# Patient Record
Sex: FEMALE | Race: WHITE | NOT HISPANIC OR LATINO | Employment: FULL TIME | URBAN - METROPOLITAN AREA
[De-identification: names, ages, dates, MRNs, and addresses within clinical notes are randomized per-mention and may not be internally consistent; named-entity substitution may affect disease eponyms.]

---

## 2018-10-06 ENCOUNTER — HOSPITAL ENCOUNTER (EMERGENCY)
Facility: MEDICAL CENTER | Age: 48
End: 2018-10-06
Attending: EMERGENCY MEDICINE
Payer: MEDICAID

## 2018-10-06 ENCOUNTER — APPOINTMENT (OUTPATIENT)
Dept: RADIOLOGY | Facility: MEDICAL CENTER | Age: 48
End: 2018-10-06
Attending: EMERGENCY MEDICINE
Payer: MEDICAID

## 2018-10-06 VITALS
TEMPERATURE: 98.1 F | SYSTOLIC BLOOD PRESSURE: 128 MMHG | HEART RATE: 81 BPM | WEIGHT: 199.3 LBS | BODY MASS INDEX: 29.52 KG/M2 | OXYGEN SATURATION: 95 % | RESPIRATION RATE: 16 BRPM | DIASTOLIC BLOOD PRESSURE: 58 MMHG | HEIGHT: 69 IN

## 2018-10-06 DIAGNOSIS — R10.32 LLQ ABDOMINAL PAIN: ICD-10-CM

## 2018-10-06 LAB
ALBUMIN SERPL BCP-MCNC: 4.3 G/DL (ref 3.2–4.9)
ALBUMIN/GLOB SERPL: 1.4 G/DL
ALP SERPL-CCNC: 39 U/L (ref 30–99)
ALT SERPL-CCNC: 21 U/L (ref 2–50)
ANION GAP SERPL CALC-SCNC: 8 MMOL/L (ref 0–11.9)
APPEARANCE UR: CLEAR
AST SERPL-CCNC: 19 U/L (ref 12–45)
BASOPHILS # BLD AUTO: 0.4 % (ref 0–1.8)
BASOPHILS # BLD: 0.03 K/UL (ref 0–0.12)
BILIRUB SERPL-MCNC: 0.4 MG/DL (ref 0.1–1.5)
BILIRUB UR QL STRIP.AUTO: NEGATIVE
BUN SERPL-MCNC: 10 MG/DL (ref 8–22)
CALCIUM SERPL-MCNC: 9.8 MG/DL (ref 8.5–10.5)
CHLORIDE SERPL-SCNC: 104 MMOL/L (ref 96–112)
CO2 SERPL-SCNC: 25 MMOL/L (ref 20–33)
COLOR UR: YELLOW
CREAT SERPL-MCNC: 0.76 MG/DL (ref 0.5–1.4)
EOSINOPHIL # BLD AUTO: 0.21 K/UL (ref 0–0.51)
EOSINOPHIL NFR BLD: 2.9 % (ref 0–6.9)
ERYTHROCYTE [DISTWIDTH] IN BLOOD BY AUTOMATED COUNT: 39.7 FL (ref 35.9–50)
GLOBULIN SER CALC-MCNC: 3 G/DL (ref 1.9–3.5)
GLUCOSE SERPL-MCNC: 84 MG/DL (ref 65–99)
GLUCOSE UR STRIP.AUTO-MCNC: NEGATIVE MG/DL
HCG SERPL QL: NEGATIVE
HCT VFR BLD AUTO: 41.6 % (ref 37–47)
HGB BLD-MCNC: 13.7 G/DL (ref 12–16)
IMM GRANULOCYTES # BLD AUTO: 0.02 K/UL (ref 0–0.11)
IMM GRANULOCYTES NFR BLD AUTO: 0.3 % (ref 0–0.9)
KETONES UR STRIP.AUTO-MCNC: NEGATIVE MG/DL
LEUKOCYTE ESTERASE UR QL STRIP.AUTO: NEGATIVE
LYMPHOCYTES # BLD AUTO: 2.08 K/UL (ref 1–4.8)
LYMPHOCYTES NFR BLD: 29.1 % (ref 22–41)
MCH RBC QN AUTO: 28.1 PG (ref 27–33)
MCHC RBC AUTO-ENTMCNC: 32.9 G/DL (ref 33.6–35)
MCV RBC AUTO: 85.4 FL (ref 81.4–97.8)
MICRO URNS: NORMAL
MONOCYTES # BLD AUTO: 0.45 K/UL (ref 0–0.85)
MONOCYTES NFR BLD AUTO: 6.3 % (ref 0–13.4)
NEUTROPHILS # BLD AUTO: 4.37 K/UL (ref 2–7.15)
NEUTROPHILS NFR BLD: 61 % (ref 44–72)
NITRITE UR QL STRIP.AUTO: NEGATIVE
NRBC # BLD AUTO: 0 K/UL
NRBC BLD-RTO: 0 /100 WBC
PH UR STRIP.AUTO: 7 [PH]
PLATELET # BLD AUTO: 258 K/UL (ref 164–446)
PMV BLD AUTO: 9.1 FL (ref 9–12.9)
POTASSIUM SERPL-SCNC: 3.8 MMOL/L (ref 3.6–5.5)
PROT SERPL-MCNC: 7.3 G/DL (ref 6–8.2)
PROT UR QL STRIP: NEGATIVE MG/DL
RBC # BLD AUTO: 4.87 M/UL (ref 4.2–5.4)
RBC UR QL AUTO: NEGATIVE
SODIUM SERPL-SCNC: 137 MMOL/L (ref 135–145)
SP GR UR STRIP.AUTO: 1.02
UROBILINOGEN UR STRIP.AUTO-MCNC: 1 MG/DL
WBC # BLD AUTO: 7.2 K/UL (ref 4.8–10.8)

## 2018-10-06 PROCEDURE — 80053 COMPREHEN METABOLIC PANEL: CPT

## 2018-10-06 PROCEDURE — 84703 CHORIONIC GONADOTROPIN ASSAY: CPT

## 2018-10-06 PROCEDURE — 36415 COLL VENOUS BLD VENIPUNCTURE: CPT

## 2018-10-06 PROCEDURE — 74177 CT ABD & PELVIS W/CONTRAST: CPT

## 2018-10-06 PROCEDURE — 99284 EMERGENCY DEPT VISIT MOD MDM: CPT

## 2018-10-06 PROCEDURE — 81003 URINALYSIS AUTO W/O SCOPE: CPT

## 2018-10-06 PROCEDURE — 700105 HCHG RX REV CODE 258: Performed by: EMERGENCY MEDICINE

## 2018-10-06 PROCEDURE — 85025 COMPLETE CBC W/AUTO DIFF WBC: CPT

## 2018-10-06 PROCEDURE — 700117 HCHG RX CONTRAST REV CODE 255: Performed by: EMERGENCY MEDICINE

## 2018-10-06 RX ORDER — HYDROCORTISONE ACETATE 25 MG/1
25 SUPPOSITORY RECTAL EVERY 12 HOURS
Qty: 10 SUPPOSITORY | Refills: 0 | Status: SHIPPED | OUTPATIENT
Start: 2018-10-06 | End: 2019-05-24

## 2018-10-06 RX ORDER — SODIUM CHLORIDE 9 MG/ML
1000 INJECTION, SOLUTION INTRAVENOUS ONCE
Status: COMPLETED | OUTPATIENT
Start: 2018-10-06 | End: 2018-10-06

## 2018-10-06 RX ADMIN — IOHEXOL 100 ML: 350 INJECTION, SOLUTION INTRAVENOUS at 18:19

## 2018-10-06 RX ADMIN — SODIUM CHLORIDE 1000 ML: 9 INJECTION, SOLUTION INTRAVENOUS at 16:36

## 2018-10-06 ASSESSMENT — PAIN SCALES - GENERAL
PAINLEVEL_OUTOF10: 1
PAINLEVEL_OUTOF10: 6

## 2018-10-06 NOTE — ED PROVIDER NOTES
"ED Provider Note    Scribed for Yady Osorio M.D. by Tre Hickey. 10/6/2018, 3:59 PM.    Primary care provider: No primary care provider on file.  Means of arrival: Walk-In  History obtained from: Patient  History limited by: None    CHIEF COMPLAINT  Chief Complaint   Patient presents with   • Rectal Pain       HPI  Christopher Kinsey is a 48 y.o. female who presents to the Emergency Department complaining of rectal pain onset one month ago. Patient describes that the pain originates from a bump near her rectum that appeared one month ago. She describes her bowel movements as normal. She has associated left lower abdominal pain onset three days ago that is severe in quality and radiates down near her rectum. Additional symptoms include fever, light-headedness, nausea, and loss of appetite. The pain is exacerbated with exertion. Patient denies any painful urination.      REVIEW OF SYSTEMS  Pertinent positives include rectal pain, left lower abdominal pain, fever, light-headedness, nausea, and loss of appetite. Pertinent negatives include no painful urination. All other systems negative.     PAST MEDICAL HISTORY  None noted.     SURGICAL HISTORY  Hx of Partial USO    SOCIAL HISTORY  Social History   Substance Use Topics   • Smoking status: Not on file   • Smokeless tobacco: Not on file   • Alcohol use Not on file      History   Drug use: Unknown       FAMILY HISTORY  None noted.     CURRENT MEDICATIONS  Home Medications    **Home medications have not yet been reviewed for this encounter**         ALLERGIES  No Known Allergies    PHYSICAL EXAM  VITAL SIGNS: /58   Pulse 83   Temp 36.7 °C (98.1 °F) (Temporal)   Resp 16   Ht 1.753 m (5' 9\")   Wt 90.4 kg (199 lb 4.7 oz)   SpO2 96%   BMI 29.43 kg/m²   Constitutional: Alert in no apparent distress.  HENT: No signs of trauma, Bilateral external ears normal, Nose normal.   Eyes: Pupils are equal and reactive, Conjunctiva normal, Non-icteric.   Neck: Normal range " of motion, No tenderness, Supple, No stridor.   Cardiovascular: Regular rate and rhythm, no murmurs.   Thorax & Lungs: Normal breath sounds, No respiratory distress, No wheezing, No chest tenderness.   Abdomen: Bowel sounds normal, Soft, mild left lower quadrant tenderness, No masses, No peritoneal signs.  Skin: Warm, Dry, No erythema, No rash.   Back:  No CVA tenderness.  Musculoskeletal:  No major deformities noted.   Neurologic: Alert, moving all extremities without difficulty, no focal deficits.  Rectal: 1 cm spherical mass seen approximately 8 o'clock with no surrounding cellulitis, mobile, and slightly firm.     LABS  Labs Reviewed   CBC WITH DIFFERENTIAL - Abnormal; Notable for the following:        Result Value    MCHC 32.9 (*)     All other components within normal limits   COMP METABOLIC PANEL   URINALYSIS,CULTURE IF INDICATED   HCG QUAL SERUM   ESTIMATED GFR     All labs reviewed by me.    RADIOLOGY  CT-ABDOMEN-PELVIS WITH   Final Result         1. No acute inflammatory change identified in the abdomen or pelvis.      2. No discrete fluid collection visualized in the rectal or anal area.        The radiologist's interpretation of all radiological studies have been reviewed by me.    COURSE & MEDICAL DECISION MAKING  Pertinent Labs & Imaging studies reviewed. (See chart for details)    Differential diagnoses include but are not limited to: Diverticulitis vs. IBS vs. UTI    3:59 PM - Patient seen and examined at bedside. Patient will be treated with IV fluids secondary to her clinical dehydration and decreased PO intake. Ordered CT Abdomen, CBC, CMP, and UA to evaluate her symptoms.     6:48 PM - Patient was reevaluated at bedside. Discussed lab and radiology results and informed the patient that everything was negative. Advised the patient to see a general surgeon and she is agreeable with this treatment plan.     HYDRATION: Based on the patient's presentation of Dehydration the patient was given IV  fluids. IV Hydration was used becasue oral hydration failed due to patient is decreased PO intake. Upon recheck following hydration, the patient was hydrated.    Decision Making:  This is a 48 y.o. year old female who presents with left lower quadrant abdominal pain.  Differential is as indicated above.  Labs and CT were performed to rule out diverticulitis.  Urinalysis is negative.  Labs are otherwise unremarkable no elevated white count or left shift no abnormal LFTs.  CT scan did not show any evidence of diverticulitis or inflammation in the abdomen.  She does have the small firm mass on the rectum it is in all location for hemorrhoid and is not on the anal verge just slightly lateral to this however it is within the anal tissue and appears vascular in nature.  I do think she requires follow-up with a general surgeon to evaluate this and have this possibly removed it could be an abnormal hemorrhoid versus malignancy.  She is agreeable she will be given the information to follow-up with Dr. Edwards, general surgery and will be discharged.     The patient will return for new or worsening symptoms and is stable at the time of discharge. Patient was given return precautions. Patient and/or family member verbalizes understanding and will comply.    DISPOSITION:  Patient will be discharged home in stable condition.    FOLLOW UP:  Ino Edwards M.D.  [address] [place] [id number] [phone]          [facility], Emergency Dept  [address] [facility] [id number] [phone]          OUTPATIENT MEDICATIONS:  New Prescriptions    HYDROCORTISONE (ANUSOL-HC) 25 MG SUPPOS    Insert 1 Suppository in rectum every 12 hours.     FINAL IMPRESSION  1. LLQ abdominal pain        This dictation has been created using voice recognition software and/or scribes. The accuracy of the dictation is limited by the abilities of the software and the expertise of the scribes. I expect there may be  some errors of grammar and possibly content. I made every attempt to manually correct the errors within my dictation. However, errors related to voice recognition software and/or scribes may still exist and should be interpreted within the appropriate context.     I, Tre Hickey (Scribe), am scribing for, and in the presence of, Yady Osorio M.D..    Electronically signed by: Tre Hickey (Scribe), 10/6/2018    I, Yady Osorio M.D. personally performed the services described in this documentation, as scribed by Tre Hickey in my presence, and it is both accurate and complete. C.     The note accurately reflects work and decisions made by me.  Yady Osorio  10/6/2018  7:30 PM

## 2019-05-24 ENCOUNTER — HOSPITAL ENCOUNTER (EMERGENCY)
Facility: MEDICAL CENTER | Age: 49
End: 2019-05-24
Attending: EMERGENCY MEDICINE
Payer: COMMERCIAL

## 2019-05-24 VITALS
RESPIRATION RATE: 15 BRPM | OXYGEN SATURATION: 94 % | WEIGHT: 200 LBS | HEART RATE: 78 BPM | DIASTOLIC BLOOD PRESSURE: 77 MMHG | TEMPERATURE: 98.2 F | BODY MASS INDEX: 29.53 KG/M2 | SYSTOLIC BLOOD PRESSURE: 138 MMHG

## 2019-05-24 DIAGNOSIS — S30.0XXA CONTUSION OF LOWER BACK, INITIAL ENCOUNTER: ICD-10-CM

## 2019-05-24 PROCEDURE — A9270 NON-COVERED ITEM OR SERVICE: HCPCS | Performed by: EMERGENCY MEDICINE

## 2019-05-24 PROCEDURE — 99284 EMERGENCY DEPT VISIT MOD MDM: CPT

## 2019-05-24 PROCEDURE — 700102 HCHG RX REV CODE 250 W/ 637 OVERRIDE(OP): Performed by: EMERGENCY MEDICINE

## 2019-05-24 RX ORDER — CYCLOBENZAPRINE HCL 10 MG
10 TABLET ORAL 3 TIMES DAILY PRN
Qty: 20 TAB | Refills: 0 | Status: SHIPPED | OUTPATIENT
Start: 2019-05-24 | End: 2019-09-11

## 2019-05-24 RX ORDER — IBUPROFEN 600 MG/1
600 TABLET ORAL ONCE
Status: COMPLETED | OUTPATIENT
Start: 2019-05-24 | End: 2019-05-24

## 2019-05-24 RX ORDER — IBUPROFEN 600 MG/1
600 TABLET ORAL EVERY 6 HOURS PRN
Qty: 20 TAB | Refills: 0 | Status: SHIPPED | OUTPATIENT
Start: 2019-05-24 | End: 2019-09-11

## 2019-05-24 RX ADMIN — IBUPROFEN 600 MG: 600 TABLET ORAL at 16:15

## 2019-05-24 ASSESSMENT — LIFESTYLE VARIABLES: DO YOU DRINK ALCOHOL: NO

## 2019-05-24 NOTE — ED NOTES
Received orders for DC. Educated regarding prescribed medications for pain dn muscle spasm and establishing care with [facility]. All questions addressed. Able to dress self and is ambulatory with a steady gait. VSS.

## 2019-05-24 NOTE — ED TRIAGE NOTES
"Pt to triage via wheelchair , pt c/o pain to her rt low back and her rt hip. Pt states \" she was on the sofa with her dog, and \"accidentally fell off the sofa when the cushions under her shifted\" . Pt states \" she landed on her dog's chew toy. States difficulty walking , had her daughter bring her to the ER  "

## 2019-05-24 NOTE — ED PROVIDER NOTES
ED Provider Note    Scribed for Elliott Russell M.D. by Matthew Alegria. 5/24/2019, 3:43 PM.    Means of arrival: Walk in  History obtained from: Patient  History limited by: None    CHIEF COMPLAINT  Chief Complaint   Patient presents with   • T-5000 FALL       HPI  Christopher Kinsey is a 48 y.o. female who presents to the Emergency Department complaining of low back pain onset 45 minutes ago. Patient reports she slid off her couch and her low back landed on her dogs chew toy. She reports her pain is worse with movement. She reports having difficulty breathing initially after the incident which is now resolved.     REVIEW OF SYSTEMS  Pertinent positives include low back pain, difficulty breathing (now resolved).   See HPI for details    PAST MEDICAL HISTORY   None noted    SURGICAL HISTORY  patient denies any surgical history    SOCIAL HISTORY  Social History   Substance Use Topics   • Smoking status: Unknown If Ever Smoked      History   Drug use: Unknown     CURRENT MEDICATIONS  Home Medications     Reviewed by Ramirez Alvarado R.N. (Registered Nurse) on 05/24/19 at 1524  Med List Status: Complete   Medication Last Dose Status        Patient Steven Taking any Medications                       ALLERGIES  No Known Allergies    PHYSICAL EXAM  VITAL SIGNS: /78   Pulse 80   Temp 36.9 °C (98.4 °F) (Temporal)   Resp 16   Wt 90.7 kg (200 lb)   SpO2 94%   BMI 29.53 kg/m²     Nursing note and vitals reviewed.  Constitutional: No distress.   HENT: Head is atraumatic. Oropharynx is moist.   Eyes: Conjunctivae are normal. Pupils are equal, round, and reactive to light.   Cardiovascular: Normal peripheral perfusion  Respiratory: No respiratory distress.   Musculoskeletal: Normal range of motion.   Back: Mild tenderness about the mid low back, no external evidence of trauma, no abrasion no bruising.  Neurological: Alert. No focal deficits noted.    Skin: No abrasion no bruising to site of injury  Psych:  Appropriate for clinical situation       COURSE & MEDICAL DECISION MAKING  Nursing notes, VS, PMSFHx reviewed in chart.    3:43 PM - Patient seen and examined at bedside. Presents with low back pain. She has mild tenderness about the mid low back, no external evidence of trauma, no abrasion no bruising. Given mechanism and my exam, suspicion for bony injury is very low and I do not believe diagnostic imaging is indicated. Patient will be treated with motrin 600mg. Plan of care discussed with the patient and she agrees to this plan of care.      DISPOSITION:  Patient will be discharged home in stable condition.    FOLLOW UP:  [facility], Emergency Dept  [address] [facility] [id number] [phone]    If symptoms worsen    [address] [address]  [phone]  Schedule an appointment as soon as possible for a visit         OUTPATIENT MEDICATIONS:  New Prescriptions    CYCLOBENZAPRINE (FLEXERIL) 10 MG TAB    Take 1 Tab by mouth 3 times a day as needed.    IBUPROFEN (MOTRIN) 600 MG TAB    Take 1 Tab by mouth every 6 hours as needed.       The patient was discharged home with an information sheet on contusions and told to return immediately for any signs or symptoms listed.  The patient agreed to the discharge precautions and follow-up plan which is documented in EPIC.    FINAL IMPRESSION  1. Contusion of lower back, initial encounter          Matthew GALINDO (Scribe), am scribing for, and in the presence of, Elliott Russell M.D..    Electronically signed by: Matthew Alegria (Robynibe), 5/24/2019    Elliott GALINDO M.D. personally performed the services described in this documentation, as scribed by Matthew Alegria in my presence, and it is both accurate and complete. E.    The note accurately reflects work and decisions made by me.  Elliott Russell  5/24/2019  4:58 PM

## 2019-09-11 ENCOUNTER — HOSPITAL ENCOUNTER (EMERGENCY)
Facility: MEDICAL CENTER | Age: 49
End: 2019-09-11
Payer: COMMERCIAL

## 2019-09-11 VITALS
TEMPERATURE: 98.4 F | OXYGEN SATURATION: 94 % | WEIGHT: 196.43 LBS | HEIGHT: 69 IN | BODY MASS INDEX: 29.09 KG/M2 | HEART RATE: 77 BPM | RESPIRATION RATE: 16 BRPM | DIASTOLIC BLOOD PRESSURE: 70 MMHG | SYSTOLIC BLOOD PRESSURE: 120 MMHG

## 2019-09-11 PROCEDURE — 302449 STATCHG TRIAGE ONLY (STATISTIC)

## 2019-09-11 SDOH — HEALTH STABILITY: MENTAL HEALTH: HOW OFTEN DO YOU HAVE 6 OR MORE DRINKS ON ONE OCCASION?: NEVER

## 2019-09-11 SDOH — HEALTH STABILITY: MENTAL HEALTH: HOW OFTEN DO YOU HAVE A DRINK CONTAINING ALCOHOL?: 2-4 TIMES A MONTH

## 2019-09-11 SDOH — HEALTH STABILITY: MENTAL HEALTH: HOW MANY STANDARD DRINKS CONTAINING ALCOHOL DO YOU HAVE ON A TYPICAL DAY?: 1 OR 2

## 2019-09-11 NOTE — ED TRIAGE NOTES
Ambulatory to triage with   Chief Complaint   Patient presents with   • T-5000 Head Injury   • Facial Pain   • Dizziness   • Nausea   • Loss of Vision     L Eye   States she was head butted by dog last night with + LOC. Above complaints today. States loss of vision in L eye for a period of time this morning.

## 2024-03-16 ENCOUNTER — HOSPITAL ENCOUNTER (EMERGENCY)
Facility: MEDICAL CENTER | Age: 54
End: 2024-03-16
Attending: EMERGENCY MEDICINE
Payer: COMMERCIAL

## 2024-03-16 ENCOUNTER — OFFICE VISIT (OUTPATIENT)
Dept: URGENT CARE | Facility: PHYSICIAN GROUP | Age: 54
End: 2024-03-16
Payer: COMMERCIAL

## 2024-03-16 ENCOUNTER — APPOINTMENT (OUTPATIENT)
Dept: RADIOLOGY | Facility: MEDICAL CENTER | Age: 54
End: 2024-03-16
Attending: EMERGENCY MEDICINE
Payer: COMMERCIAL

## 2024-03-16 VITALS
HEART RATE: 73 BPM | SYSTOLIC BLOOD PRESSURE: 121 MMHG | OXYGEN SATURATION: 98 % | RESPIRATION RATE: 20 BRPM | TEMPERATURE: 97 F | WEIGHT: 210 LBS | DIASTOLIC BLOOD PRESSURE: 65 MMHG | BODY MASS INDEX: 31.01 KG/M2

## 2024-03-16 VITALS
DIASTOLIC BLOOD PRESSURE: 90 MMHG | OXYGEN SATURATION: 97 % | BODY MASS INDEX: 31.1 KG/M2 | HEART RATE: 74 BPM | WEIGHT: 210 LBS | RESPIRATION RATE: 16 BRPM | TEMPERATURE: 98.7 F | HEIGHT: 69 IN | SYSTOLIC BLOOD PRESSURE: 140 MMHG

## 2024-03-16 DIAGNOSIS — R06.02 SHORTNESS OF BREATH: ICD-10-CM

## 2024-03-16 DIAGNOSIS — R42 DIZZINESS: ICD-10-CM

## 2024-03-16 DIAGNOSIS — F41.9 ANXIETY: ICD-10-CM

## 2024-03-16 DIAGNOSIS — F43.9 STRESS: ICD-10-CM

## 2024-03-16 DIAGNOSIS — R53.83 OTHER FATIGUE: ICD-10-CM

## 2024-03-16 DIAGNOSIS — R07.9 CHEST PAIN, UNSPECIFIED TYPE: Primary | ICD-10-CM

## 2024-03-16 DIAGNOSIS — R07.89 FEELING OF CHEST TIGHTNESS: ICD-10-CM

## 2024-03-16 DIAGNOSIS — T88.7XXA MEDICATION SIDE EFFECT: ICD-10-CM

## 2024-03-16 LAB
ALBUMIN SERPL BCP-MCNC: 4.5 G/DL (ref 3.2–4.9)
ALBUMIN/GLOB SERPL: 1.4 G/DL
ALP SERPL-CCNC: 57 U/L (ref 30–99)
ALT SERPL-CCNC: 25 U/L (ref 2–50)
ANION GAP SERPL CALC-SCNC: 11 MMOL/L (ref 7–16)
AST SERPL-CCNC: 26 U/L (ref 12–45)
BASOPHILS # BLD AUTO: 0.2 % (ref 0–1.8)
BASOPHILS # BLD: 0.01 K/UL (ref 0–0.12)
BILIRUB SERPL-MCNC: 0.3 MG/DL (ref 0.1–1.5)
BUN SERPL-MCNC: 16 MG/DL (ref 8–22)
CALCIUM ALBUM COR SERPL-MCNC: 9.1 MG/DL (ref 8.5–10.5)
CALCIUM SERPL-MCNC: 9.5 MG/DL (ref 8.5–10.5)
CHLORIDE SERPL-SCNC: 106 MMOL/L (ref 96–112)
CO2 SERPL-SCNC: 23 MMOL/L (ref 20–33)
CREAT SERPL-MCNC: 0.84 MG/DL (ref 0.5–1.4)
EKG IMPRESSION: NORMAL
EOSINOPHIL # BLD AUTO: 0.21 K/UL (ref 0–0.51)
EOSINOPHIL NFR BLD: 3.7 % (ref 0–6.9)
ERYTHROCYTE [DISTWIDTH] IN BLOOD BY AUTOMATED COUNT: 40.1 FL (ref 35.9–50)
FLUAV RNA SPEC QL NAA+PROBE: NEGATIVE
FLUBV RNA SPEC QL NAA+PROBE: NEGATIVE
GFR SERPLBLD CREATININE-BSD FMLA CKD-EPI: 83 ML/MIN/1.73 M 2
GLOBULIN SER CALC-MCNC: 3.2 G/DL (ref 1.9–3.5)
GLUCOSE SERPL-MCNC: 111 MG/DL (ref 65–99)
HCT VFR BLD AUTO: 41.5 % (ref 37–47)
HGB BLD-MCNC: 14.1 G/DL (ref 12–16)
IMM GRANULOCYTES # BLD AUTO: 0.02 K/UL (ref 0–0.11)
IMM GRANULOCYTES NFR BLD AUTO: 0.4 % (ref 0–0.9)
LYMPHOCYTES # BLD AUTO: 1.99 K/UL (ref 1–4.8)
LYMPHOCYTES NFR BLD: 35 % (ref 22–41)
MCH RBC QN AUTO: 28.1 PG (ref 27–33)
MCHC RBC AUTO-ENTMCNC: 34 G/DL (ref 32.2–35.5)
MCV RBC AUTO: 82.8 FL (ref 81.4–97.8)
MONOCYTES # BLD AUTO: 0.44 K/UL (ref 0–0.85)
MONOCYTES NFR BLD AUTO: 7.7 % (ref 0–13.4)
NEUTROPHILS # BLD AUTO: 3.01 K/UL (ref 1.82–7.42)
NEUTROPHILS NFR BLD: 53 % (ref 44–72)
NRBC # BLD AUTO: 0 K/UL
NRBC BLD-RTO: 0 /100 WBC (ref 0–0.2)
NT-PROBNP SERPL IA-MCNC: <36 PG/ML (ref 0–125)
PLATELET # BLD AUTO: 259 K/UL (ref 164–446)
PMV BLD AUTO: 9.3 FL (ref 9–12.9)
POTASSIUM SERPL-SCNC: 4 MMOL/L (ref 3.6–5.5)
PROT SERPL-MCNC: 7.7 G/DL (ref 6–8.2)
RBC # BLD AUTO: 5.01 M/UL (ref 4.2–5.4)
RSV RNA SPEC QL NAA+PROBE: NEGATIVE
SARS-COV-2 RNA RESP QL NAA+PROBE: NOTDETECTED
SODIUM SERPL-SCNC: 140 MMOL/L (ref 135–145)
TROPONIN T SERPL-MCNC: 15 NG/L (ref 6–19)
WBC # BLD AUTO: 5.7 K/UL (ref 4.8–10.8)

## 2024-03-16 PROCEDURE — 0241U HCHG SARS-COV-2 COVID-19 NFCT DS RESP RNA 4 TRGT ED POC: CPT

## 2024-03-16 PROCEDURE — 93005 ELECTROCARDIOGRAM TRACING: CPT | Performed by: EMERGENCY MEDICINE

## 2024-03-16 PROCEDURE — A9270 NON-COVERED ITEM OR SERVICE: HCPCS | Performed by: EMERGENCY MEDICINE

## 2024-03-16 PROCEDURE — 71045 X-RAY EXAM CHEST 1 VIEW: CPT

## 2024-03-16 PROCEDURE — 36415 COLL VENOUS BLD VENIPUNCTURE: CPT

## 2024-03-16 PROCEDURE — 93005 ELECTROCARDIOGRAM TRACING: CPT

## 2024-03-16 PROCEDURE — 3080F DIAST BP >= 90 MM HG: CPT | Performed by: NURSE PRACTITIONER

## 2024-03-16 PROCEDURE — 99205 OFFICE O/P NEW HI 60 MIN: CPT | Performed by: NURSE PRACTITIONER

## 2024-03-16 PROCEDURE — 93000 ELECTROCARDIOGRAM COMPLETE: CPT | Performed by: NURSE PRACTITIONER

## 2024-03-16 PROCEDURE — 99284 EMERGENCY DEPT VISIT MOD MDM: CPT

## 2024-03-16 PROCEDURE — 83880 ASSAY OF NATRIURETIC PEPTIDE: CPT

## 2024-03-16 PROCEDURE — 85025 COMPLETE CBC W/AUTO DIFF WBC: CPT

## 2024-03-16 PROCEDURE — 80053 COMPREHEN METABOLIC PANEL: CPT

## 2024-03-16 PROCEDURE — 700111 HCHG RX REV CODE 636 W/ 250 OVERRIDE (IP): Performed by: EMERGENCY MEDICINE

## 2024-03-16 PROCEDURE — 3077F SYST BP >= 140 MM HG: CPT | Performed by: NURSE PRACTITIONER

## 2024-03-16 PROCEDURE — 84484 ASSAY OF TROPONIN QUANT: CPT

## 2024-03-16 PROCEDURE — 1126F AMNT PAIN NOTED NONE PRSNT: CPT | Performed by: NURSE PRACTITIONER

## 2024-03-16 PROCEDURE — 700102 HCHG RX REV CODE 250 W/ 637 OVERRIDE(OP): Performed by: EMERGENCY MEDICINE

## 2024-03-16 RX ORDER — FLUTICASONE PROPIONATE 50 MCG
SPRAY, SUSPENSION (ML) NASAL
COMMUNITY
Start: 2024-03-07

## 2024-03-16 RX ORDER — HYDROCODONE BITARTRATE AND ACETAMINOPHEN 5; 325 MG/1; MG/1
1 TABLET ORAL ONCE
Status: COMPLETED | OUTPATIENT
Start: 2024-03-16 | End: 2024-03-16

## 2024-03-16 RX ORDER — ONDANSETRON 4 MG/1
4 TABLET, ORALLY DISINTEGRATING ORAL ONCE
Status: COMPLETED | OUTPATIENT
Start: 2024-03-16 | End: 2024-03-16

## 2024-03-16 RX ORDER — TOPIRAMATE 25 MG/1
25 TABLET ORAL EVERY MORNING
COMMUNITY
Start: 2024-03-07

## 2024-03-16 RX ORDER — TOPIRAMATE 50 MG/1
50 TABLET, FILM COATED ORAL 2 TIMES DAILY
COMMUNITY

## 2024-03-16 RX ORDER — DOXYCYCLINE HYCLATE 100 MG/1
CAPSULE ORAL
COMMUNITY
Start: 2024-03-07

## 2024-03-16 RX ADMIN — HYDROCODONE BITARTRATE AND ACETAMINOPHEN 1 TABLET: 5; 325 TABLET ORAL at 18:48

## 2024-03-16 RX ADMIN — ONDANSETRON 4 MG: 4 TABLET, ORALLY DISINTEGRATING ORAL at 18:47

## 2024-03-16 ASSESSMENT — PAIN SCALES - GENERAL: PAINLEVEL: NO PAIN

## 2024-03-16 NOTE — ED TRIAGE NOTES
Pt to triage .  Chief Complaint   Patient presents with    Shortness of Breath     Pt sent by [facility] for further evaluation of Shortness of Breath, Chest tightness, and bilateral arm tightness after increasing her dose of Topiramate 3 days ago

## 2024-03-16 NOTE — PROGRESS NOTES
Subjective:   Crhistopher Singleton is a 53 y.o. female who presents for Shortness of Breath (X 3 days ago /With chest pain feels like heart burn x 3 day bilateral arms feel tight /This started after she started new medication Topiramate 25mg on 03/07/2024)       HPI  Patient is a pleasant 53 y.o. who presents for evaluation of 3-day history of shortness of breath, feeling of chest heaviness, orthopnea, bilateral upper extremity tightness combined with weakness, as well as extreme fatigue.  Patient recently changed her topiramate medication as well as having an antibiotic, is concerned about possible reaction.  Patient reports that she has laid on the ground, as well as raised hands up in the air to open up her lungs to obtain an deeper breath.  Additionally, patient reports feelings of recurrent heartburn type of burning chest feeling.  She is concerned due to getting up gasping for air last night. Denies any family history of early cardiac disease, or lower extremity swelling.    ROS  All other systems are negative except as documented above within HPI.    MEDS:   Current Outpatient Medications:     topiramate (TOPAMAX) 25 MG Tab, Take 25 mg by mouth every morning., Disp: , Rfl:     doxycycline (VIBRAMYCIN) 100 MG Cap, TAKE 1 CAPSULE BY MOUTH TWICE DAILY FOR 7 DAYS WITH FOOD. FOR EAR INFECTION, Disp: , Rfl:     fluticasone (FLONASE) 50 MCG/ACT nasal spray, SHAKE LIQUID AND USE 1 SPRAY IN EACH NOSTRIL DAILY, Disp: , Rfl:     topiramate (TOPAMAX) 50 MG tablet, Take 50 mg by mouth 2 times a day. In the AM and PM, Disp: , Rfl:     promethazine (PHENERGAN) 25 MG Tab, TAKE 1 TABLET BY MOUTH EVERY 6 HOURS FOR 7 DAYS AS NEEDED FOR NAUSEA OR VOMITING TAKE AS NEEDED FOR MIGRAINES / NAUSEA, Disp: , Rfl:     SUMAtriptan (IMITREX) 50 MG Tab, TAKE 1 TABLET BY MOUTH 1 TIME AS NEEDED FOR MIGRAINE HEADACHE. MAY REPEAT DOSE IN 2 HOURS AS NEEDED. NO MORE THAN 2 DOSES IN 24 HOURS, Disp: , Rfl:     diclofenac sodium (VOLTAREN) 1 % Gel,  "APPLY 2 GRAMS TOPICALLY TO THE AFFECTED AREA FOUR TIMES DAILY (Patient not taking: Reported on 3/16/2024), Disp: , Rfl:   ALLERGIES: No Known Allergies    Patient's PMH, SocHx, SurgHx, FamHx, Drug allergies and medications were reviewed.     Objective:   BP (!) 140/90 (BP Location: Right arm, Patient Position: Sitting, BP Cuff Size: Adult) Comment: sob,headache, provider aware  Pulse 74   Temp 37.1 °C (98.7 °F) (Temporal)   Resp 16   Ht 1.753 m (5' 9\")   Wt 95.3 kg (210 lb)   SpO2 97%   BMI 31.01 kg/m²     Physical Exam  Vitals and nursing note reviewed.   Constitutional:       General: She is awake.      Appearance: Normal appearance. She is well-developed.   HENT:      Head: Normocephalic and atraumatic.      Right Ear: External ear normal.      Left Ear: External ear normal.      Nose: Nose normal.      Mouth/Throat:      Mouth: Mucous membranes are moist.      Pharynx: Oropharynx is clear.   Eyes:      Extraocular Movements: Extraocular movements intact.      Conjunctiva/sclera: Conjunctivae normal.   Cardiovascular:      Rate and Rhythm: Normal rate and regular rhythm.      Pulses: Normal pulses.      Heart sounds: Normal heart sounds.   Pulmonary:      Effort: Pulmonary effort is normal.      Breath sounds: Normal breath sounds and air entry.   Musculoskeletal:         General: Normal range of motion.      Cervical back: Normal range of motion and neck supple.      Right lower leg: No edema.      Left lower leg: No edema.   Skin:     General: Skin is warm and dry.   Neurological:      Mental Status: She is alert and oriented to person, place, and time.   Psychiatric:         Mood and Affect: Mood normal.         Behavior: Behavior normal.         Thought Content: Thought content normal.     EKG- NSR w/o acute changes    Assessment/Plan:   Assessment    1. Feeling of chest tightness  - EKG - Clinic Performed    2. Other fatigue  - EKG - Clinic Performed    3. Dizziness  - EKG - Clinic Performed    4. " Shortness of breath  - EKG - Clinic Performed      Vital signs stable at today's acute urgent care visit.  Reviewed patient's EKG with her, does not show acute ST changes.  However, due to her description pain and symptoms, she is advised to go to ED for further evaluation and higher level of care.  Patient is understanding and agreeable to this.  She will have her daughter take her from today's urgent care visit.  All questions were encouraged and answered to the patient's satisfaction and understanding, and they agree to the plan of care.     This is an acute problem with uncertain prognosis, medication management and instructions as well as management options were provided.  I personally reviewed prior external notes and test results pertinent to today and independently reviewed and interpreted all diagnostics, to include POCT testing. Time spent evaluating this patient includes preparing for visit, counseling/education, exam, evaluation, obtaining history, and ordering lab/test/procedures.      Please note that this dictation was created using voice recognition software. I have made a reasonable attempt to correct obvious errors, but I expect that there are errors of grammar and possibly content that I did not discover before finalizing the note.

## 2024-03-17 NOTE — ED NOTES
Pt understands DC instructions and follow-up, DC paperwork provided, pt ambulated with steady gait to ERIC madsen for DC

## 2024-03-17 NOTE — ED PROVIDER NOTES
ED Provider Note    Scribed for Chilango Sharma by Wade Hernandez. 3/16/2024  5:56 PM    Primary care provider: Pcp Pt States None  Means of arrival: walk in  History obtained from: Patient  History limited by: None    CHIEF COMPLAINT  Chief Complaint   Patient presents with    Shortness of Breath     Pt sent by [facility] for further evaluation of Shortness of Breath, Chest tightness, and bilateral arm tightness after increasing her dose of Topiramate 3 days ago      EXTERNAL RECORDS REVIEWED  Outpatient Notes Seen at urgent care earlier today for chest tightness    HPI/ROS    LIMITATION TO HISTORY   Select: : None  OUTSIDE HISTORIAN(S):  Family at bedside    HPI  Christopher Singleton is a 53 y.o. female who presents to the Emergency Department for shortness of breath onset 3 days ago. She states that three days ago she increased her dosage of topiramate. She had been taking this for chronic migraines for around 2 months now. She had an MRI performed which showed two abnormalities and swelling in the posterior head. She was placed on an antibiotic for this as it was thought that this could be an infection. For the past three days she had been experiencing chest tightness and hand cramping/tightness. She endorses cough, increased vomiting and vomiting as well. She denies any body aches, abdominal pain or fevers. She denies any cardiac history. She does have a history of stroke. She has a history of seizures.     Daughter notes that recently she has been very stressed out and anxious because she is supposed to have surgery and has been unable to work.     REVIEW OF SYSTEMS  As above, all other systems reviewed and are negative.   See HPI for further details.     PAST MEDICAL HISTORY   Migraine, stroke    SURGICAL HISTORY  patient denies any surgical history    SOCIAL HISTORY  Social History     Tobacco Use    Smoking status: Never    Smokeless tobacco: Never   Vaping Use    Vaping Use: Never used   Substance Use Topics     Alcohol use: Yes    Drug use: Never      Social History     Substance and Sexual Activity   Drug Use Never     FAMILY HISTORY  No family history on file.  CURRENT MEDICATIONS  Home Medications       Reviewed by Khadijah Wayne R.N. (Registered Nurse) on 03/16/24 at 1514  Med List Status: <None>     Medication Last Dose Status   diclofenac sodium (VOLTAREN) 1 % Gel  Active   doxycycline (VIBRAMYCIN) 100 MG Cap  Active   fluticasone (FLONASE) 50 MCG/ACT nasal spray  Active   promethazine (PHENERGAN) 25 MG Tab  Active   SUMAtriptan (IMITREX) 50 MG Tab  Active   topiramate (TOPAMAX) 25 MG Tab  Active   topiramate (TOPAMAX) 50 MG tablet  Active                  ALLERGIES  No Known Allergies    PHYSICAL EXAM    VITAL SIGNS:   Vitals:    03/16/24 1510 03/16/24 1730 03/16/24 1800 03/16/24 1900   BP:  124/60 111/54 121/65   Pulse:  70 82 73   Resp:  14 18 20   Temp:       TempSrc:       SpO2:  97% 98% 98%   Weight: 95.3 kg (210 lb)        Vitals: My interpretation: normotensive, not tachycardic, afebrile, not hypoxic    Reinterpretation of vitals: Unchanged and unremarkable    Cardiac Monitor Interpretation: The cardiac monitor revealed normal Sinus Rhythm as interpreted by me. The cardiac monitor was ordered secondary to the patient's history of chest tightness and to monitor for dysrhythmia and/or tachycardia.    PE:   Gen: sitting comfortably, speaking clearly, appears in no acute distress   ENT: Mucous membranes moist, posterior pharynx clear, uvula midline, nares patent bilaterally   Neck: Supple, FROM  Pulmonary: Lungs are clear to auscultation bilaterally. No tachypnea  CV:  RRR, no murmur appreciated, pulses 2+ in both upper and lower extremities  Abdomen: soft, NT/ND; no rebound/guarding  : no CVA or suprapubic tenderness   Neuro: A&Ox4 (person, place, time, situation), speech fluent, gait steady, no focal deficits appreciated  Skin: No rash or lesions.  No pallor or jaundice.  No cyanosis.  Warm and dry.      DIAGNOSTIC STUDIES / PROCEDURES    LABS  Results for orders placed or performed during the hospital encounter of 03/16/24   CBC with Differential   Result Value Ref Range    WBC 5.7 4.8 - 10.8 K/uL    RBC 5.01 4.20 - 5.40 M/uL    Hemoglobin 14.1 12.0 - 16.0 g/dL    Hematocrit 41.5 37.0 - 47.0 %    MCV 82.8 81.4 - 97.8 fL    MCH 28.1 27.0 - 33.0 pg    MCHC 34.0 32.2 - 35.5 g/dL    RDW 40.1 35.9 - 50.0 fL    Platelet Count 259 164 - 446 K/uL    MPV 9.3 9.0 - 12.9 fL    Neutrophils-Polys 53.00 44.00 - 72.00 %    Lymphocytes 35.00 22.00 - 41.00 %    Monocytes 7.70 0.00 - 13.40 %    Eosinophils 3.70 0.00 - 6.90 %    Basophils 0.20 0.00 - 1.80 %    Immature Granulocytes 0.40 0.00 - 0.90 %    Nucleated RBC 0.00 0.00 - 0.20 /100 WBC    Neutrophils (Absolute) 3.01 1.82 - 7.42 K/uL    Lymphs (Absolute) 1.99 1.00 - 4.80 K/uL    Monos (Absolute) 0.44 0.00 - 0.85 K/uL    Eos (Absolute) 0.21 0.00 - 0.51 K/uL    Baso (Absolute) 0.01 0.00 - 0.12 K/uL    Immature Granulocytes (abs) 0.02 0.00 - 0.11 K/uL    NRBC (Absolute) 0.00 K/uL   Complete Metabolic Panel (CMP)   Result Value Ref Range    Sodium 140 135 - 145 mmol/L    Potassium 4.0 3.6 - 5.5 mmol/L    Chloride 106 96 - 112 mmol/L    Co2 23 20 - 33 mmol/L    Anion Gap 11.0 7.0 - 16.0    Glucose 111 (H) 65 - 99 mg/dL    Bun 16 8 - 22 mg/dL    Creatinine 0.84 0.50 - 1.40 mg/dL    Calcium 9.5 8.5 - 10.5 mg/dL    Correct Calcium 9.1 8.5 - 10.5 mg/dL    AST(SGOT) 26 12 - 45 U/L    ALT(SGPT) 25 2 - 50 U/L    Alkaline Phosphatase 57 30 - 99 U/L    Total Bilirubin 0.3 0.1 - 1.5 mg/dL    Albumin 4.5 3.2 - 4.9 g/dL    Total Protein 7.7 6.0 - 8.2 g/dL    Globulin 3.2 1.9 - 3.5 g/dL    A-G Ratio 1.4 g/dL   Troponins NOW   Result Value Ref Range    Troponin T 15 6 - 19 ng/L   ESTIMATED GFR   Result Value Ref Range    GFR (CKD-EPI) 83 >60 mL/min/1.73 m 2   proBrain Natriuretic Peptide, NT   Result Value Ref Range    NT-proBNP <36 0 - 125 pg/mL   EKG (NOW)   Result Value Ref Range    Report        [facility] Emergency Dept.    Test Date:  2024  Pt Name:    TRISH PEÑA               Department: ER  MRN:        [id number]                      Room:  Gender:     Female                       Technician: [id number]  :        1970                   Requested By:ER TRIAGE PROTOCOL  Order #:    [id number]                    Reading MD: Chilango Sharma    Measurements  Intervals                                Axis  Rate:       71                           P:          46  VA:         153                          QRS:        76  QRSD:       110                          T:          58  QT:         410  QTc:        446    Interpretive Statements  Sinus rhythm  Low voltage, precordial leads  No previous ECG available for comparison  Electronically Signed On 2024 17:57:23 PDT by Chilango Sharma     POC CoV-2, FLU A/B, RSV by PCR   Result Value Ref Range    POC Influenza A RNA, PCR Negative Negative    POC Influenza B RNA, PCR Negative Negative    POC RSV, by PCR Negative Negative    POC SARS-CoV-2, PCR NotDetected       All labs reviewed by me. Labs were compared to prior labs if they were available. Significant for no leukocytosis, no anemia, normal electrolytes, normal renal function, normal liver enzymes, normal bilirubin, troponin negative, BNP normal, flu COVID and RSV negative.    RADIOLOGY  I have independently interpreted the diagnostic imaging associated with this visit and am waiting the final reading from the radiologist.   My preliminary interpretation is a follows: No cardiomegaly, no focal consolidations  Radiologist interpretation is as follows:  DX-CHEST-PORTABLE (1 VIEW)   Final Result      No acute cardiopulmonary abnormality.        COURSE & MEDICAL DECISION MAKING  Nursing notes, VS, PMSFHx, labs, imaging, EKG reviewed in chart.    Heart Score: Low    ED Observation Status? No; Patient does not meet criteria for ED Observation.     Ddx: Flu, COVID, RSV,  pneumonia, PE, MI, medication side effect    MDM: 5:56 PM Christopher Singleton is a 53 y.o. female who presented with evaluation of some mild shortness of breath, chest discomfort, numbness and tingling in her hands, since increasing her dose of topiramate.  She has chronic migraines.  Daughter at bedside helps provide collateral information.  Daughter is quite convinced that most of her symptoms are due to stress and anxiety which the patient suffers from.  Chest pain description is nonspecific, nonexertional, nonpleuritic, nonradiating.  Her vital signs upon arrival here are normal.  Her physical exam is quite benign, lungs are clear to auscultation.  Neurological exam is nonfocal.  Initiated workup here with chest x-ray, labs and EKG.  Chest x-ray on my independent interpretation shows no focal consolidation or cardiomegaly.  Her EKG is with sinus rhythm and no ischemic changes or arrhythmia.All labs reviewed by me. Labs were compared to prior labs if they were available. Significant for no leukocytosis, no anemia, normal electrolytes, normal renal function, normal liver enzymes, normal bilirubin, troponin negative, BNP normal, flu COVID and RSV negative.  Patient was treated with a dose of Lortab and Zofran to help with symptoms here in the ED.  On reevaluation she still feels significantly improved.  I do think this is likely combination of medication side effect and possibly stress.  But considering her negative workup, normal EKG, labs, viral panel and chest x-ray I do not think she needs further extensive workup here in the hospital or emergency department.  Discussed all of her findings with her family at bedside they verbalized understanding and feel decision for discharge is appropriate.  I discussed return precautions with them and they verbalized understanding and are amenable.    ADDITIONAL PROBLEM LIST AND DISPOSITION    I have discussed management of the patient with the following physicians and ARCHIE's:  None    Discussion of management with other hospitals or appropriate source(s): None     Escalation of care considered, and ultimately not performed:after discussion with the patient / family, they have elected to decline an escalation in care    Barriers to care at this time, including but not limited to: Patient does not have established PCP.     Decision tools and prescription drugs considered including, but not limited to: HEART Score low .    FINAL IMPRESSION  1. Chest pain, unspecified type Acute   2. Anxiety Acute   3. Stress Acute   4. Medication side effect Acute       Wade GALINDO (Scribsharon), am scribing for, and in the presence of, Chilango Sharma.    Electronically signed by: Wade Hernandez (Robynibe), 3/16/2024    IChilango personally performed the services described in this documentation, as scribed by Wade Hernandez in my presence, and it is both accurate and complete.    The note accurately reflects work and decisions made by me.  Chilango Sharma  3/16/2024  7:29 PM

## 2024-03-17 NOTE — DISCHARGE INSTRUCTIONS
Thankfully your workup here was negative.  Your flu, COVID and RSV are negative, your EKG, chest x-ray and labs were very normal which very reassuring.  I think this may be combination of stress and anxiety as well as medication side effect.  I want to follow-up with your primary team to see if they want to make adjustments to your medications.  If you have any worsening symptoms or concerns please return to the ED for further evaluation treatment.  Thank for coming in today.

## 2024-07-15 ENCOUNTER — APPOINTMENT (RX ONLY)
Dept: URBAN - METROPOLITAN AREA CLINIC 15 | Facility: CLINIC | Age: 54
Setting detail: DERMATOLOGY
End: 2024-07-15

## 2025-01-03 ENCOUNTER — HOSPITAL ENCOUNTER (EMERGENCY)
Facility: MEDICAL CENTER | Age: 55
End: 2025-01-03
Attending: EMERGENCY MEDICINE

## 2025-01-03 ENCOUNTER — APPOINTMENT (OUTPATIENT)
Dept: RADIOLOGY | Facility: MEDICAL CENTER | Age: 55
End: 2025-01-03
Attending: EMERGENCY MEDICINE

## 2025-01-03 VITALS
HEART RATE: 72 BPM | HEIGHT: 69 IN | WEIGHT: 214.73 LBS | OXYGEN SATURATION: 94 % | RESPIRATION RATE: 16 BRPM | DIASTOLIC BLOOD PRESSURE: 67 MMHG | SYSTOLIC BLOOD PRESSURE: 138 MMHG | TEMPERATURE: 99.3 F | BODY MASS INDEX: 31.8 KG/M2

## 2025-01-03 DIAGNOSIS — M54.32 SCIATICA OF LEFT SIDE: ICD-10-CM

## 2025-01-03 DIAGNOSIS — S39.012A STRAIN OF LUMBAR REGION, INITIAL ENCOUNTER: ICD-10-CM

## 2025-01-03 PROCEDURE — 72131 CT LUMBAR SPINE W/O DYE: CPT

## 2025-01-03 PROCEDURE — 99283 EMERGENCY DEPT VISIT LOW MDM: CPT

## 2025-01-03 RX ORDER — METHYLPREDNISOLONE 4 MG/1
TABLET ORAL
Qty: 1 EACH | Refills: 0 | Status: SHIPPED | OUTPATIENT
Start: 2025-01-03

## 2025-01-03 RX ORDER — HYDROCODONE BITARTRATE AND ACETAMINOPHEN 5; 325 MG/1; MG/1
1 TABLET ORAL EVERY 4 HOURS PRN
Qty: 10 TABLET | Refills: 0 | Status: SHIPPED | OUTPATIENT
Start: 2025-01-03 | End: 2025-01-06

## 2025-01-03 RX ORDER — METHOCARBAMOL 750 MG/1
750 TABLET, FILM COATED ORAL 4 TIMES DAILY PRN
Qty: 30 TABLET | Refills: 0 | Status: SHIPPED | OUTPATIENT
Start: 2025-01-03

## 2025-01-03 RX ORDER — GABAPENTIN 100 MG/1
200 CAPSULE ORAL 3 TIMES DAILY PRN
Qty: 30 CAPSULE | Refills: 0 | Status: SHIPPED | OUTPATIENT
Start: 2025-01-03

## 2025-01-03 ASSESSMENT — FIBROSIS 4 INDEX: FIB4 SCORE: 0.82

## 2025-01-04 NOTE — ED NOTES
Pt discharged to home. Pt was given follow up instructions and prescriptions for Gabapentin, Norco, Robaxin, and Methylprednisolone. Pt verbalized understanding of all instructions for discharge and is ambulatory out of ED with steady gait. AOx4

## 2025-01-04 NOTE — ED PROVIDER NOTES
ED Provider Note    CHIEF COMPLAINT  Chief Complaint   Patient presents with    Low Back Pain     PT reports she slipped on stairs and landed hard on lower back 2 weeks ago. PT reports she has been having increasing discomfort in lower back and with sciatica in left leg. PT denies loss of control of bowel or bladder.         EXTERNAL RECORDS REVIEWED  Outpatient Notes outpatient notes from June reviewed.  She had dynamic x-rays from [facility] in April of last year with some disc collapse and facet arthropathy through the lumbar spine no instability on flexion-extension views.  At that time she was given diclofenac and Robaxin    HPI/ROS  LIMITATION TO HISTORY   Select: : None  OUTSIDE HISTORIAN(S):  none    Christopher Singleton is a 54 y.o. female who presents for worsening low back pain.  Patient has a history of some neck and back chronic issues but 2 weeks ago she slipped on her stairs and landed on her back she had a bruise there for quite some time.  After the fall she was in bed for 2 days because of the pain.  She has been having worsening pain and left-sided sciatica with pain radiating into her left buttock and into her left leg.  She denies any lower extremity weakness she has tingling of her left leg and some radicular paresthesias however no loss of bowel or urine, no saddle and anesthesia or incontinence.  She can ambulate however it does hurt.  It feels better when she takes a bath and relaxes it for a little bit of time.  She has not been sleeping because of the pain.    PAST MEDICAL HISTORY       SURGICAL HISTORY  patient denies any surgical history    FAMILY HISTORY  History reviewed. No pertinent family history.    SOCIAL HISTORY  Social History     Tobacco Use    Smoking status: Never    Smokeless tobacco: Never   Vaping Use    Vaping status: Never Used   Substance and Sexual Activity    Alcohol use: Not Currently    Drug use: Never    Sexual activity: Not on file       CURRENT MEDICATIONS  Home  "Medications       Reviewed by Sharyn Sears R.N. (Registered Nurse) on 01/03/25 at 1651  Med List Status: Partial     Medication Last Dose Status   diclofenac sodium (VOLTAREN) 1 % Gel  Active   doxycycline (VIBRAMYCIN) 100 MG Cap  Active   fluticasone (FLONASE) 50 MCG/ACT nasal spray  Active   promethazine (PHENERGAN) 25 MG Tab  Active   SUMAtriptan (IMITREX) 50 MG Tab  Active   topiramate (TOPAMAX) 25 MG Tab  Active   topiramate (TOPAMAX) 50 MG tablet  Active                    ALLERGIES  Allergies   Allergen Reactions    Amoxicillin Swelling    Cinnamon Anaphylaxis    Topiramate      Tongue swelling       PHYSICAL EXAM  VITAL SIGNS: /59   Pulse 75   Temp 37.4 °C (99.3 °F) (Temporal)   Resp 16   Ht 1.753 m (5' 9\")   Wt 97.4 kg (214 lb 11.7 oz)   SpO2 96%   BMI 31.71 kg/m²    Constitutional: Alert in no apparent distress.  HENT: Normocephalic, Atraumatic, Bilateral external ears normal. Nose normal.   Eyes:  Conjunctiva normal, non-icteric.   Heart: Regular rate and rhythm, no murmurs.   Lungs: Non-labored respirations  Skin: Warm, Dry, No erythema, No rash.   Back: No deformity slightly healing hematoma around L2.  Positive straight leg raise on the left.  Intact sensation distally.  Neurologic: Alert, Grossly non-focal.  Gait normal.  Psychiatric: Affect normal, Judgment normal, Mood normal, Appears appropriate and not intoxicated.   Extremities: Intact distal pulses, No edema, No tenderness.         RADIOLOGY/PROCEDURES   I have independently interpreted the diagnostic imaging associated with this visit and am waiting the final reading from the radiologist.   My preliminary interpretation is as follows: No fracture seen    Radiologist interpretation:  CT-LSPINE W/O PLUS RECONS   Final Result      1. Multilevel lumbar spondylosis, detailed by level above.   2. Multilevel posterior disc bulges, with disc herniations at L1-2 and L3-4.   3. Multilevel bilateral foraminal stenosis, greater on the " right than the left.   4. No fracture.          COURSE & MEDICAL DECISION MAKING    ASSESSMENT, COURSE AND PLAN  Care Narrative: This is a 54-year-old female that presents with worsening back pain and left radicular symptoms.  She has no lower extremity weakness she is able to ambulate.  She has no saddle anesthesia or incontinence concerning for cauda equina at this time.  I did do a CT scan given her trauma 2 weeks ago.  She does have some degenerative disease foraminal stenosis and disc bulges.  I suspect she has an underlying chronic component which got exacerbated by the fall.  I do think she needs pain control.  She will be given a course of steroids, Robaxin pain medication and gabapentin for pain to help.  I do think she can be discharged with outpatient spine follow-up.  She is agreeable to this plan.    DISPOSITION AND DISCUSSIONS  I have discussed management of the patient with the following physicians and ARCHIE's:  none    Discussion of management with other [facility] or appropriate source(s): None     Escalation of care considered, and ultimately not performed:diagnostic imaging    Barriers to care at this time, including but not limited to:  none .     Decision tools and prescription drugs considered including, but not limited to: Pain Medications   .     The patient will return for new or worsening symptoms and is stable at the time of discharge. Patient was given return precautions. Patient and/or family member verbalizes understanding and will comply.    DISPOSITION:  Patient will be discharged home in stable condition.    FOLLOW UP:  Haseeb Morin D.O.  [address] [place] [id number]  [phone]          [facility], Emergency Dept  [address] [facility] [id number] [phone]    Return for worsening pain weakness incontinence or other concerns.      OUTPATIENT MEDICATIONS:  Discharge Medication List as of 1/3/2025  8:34 PM        START taking these  medications    Details   methylPREDNISolone (MEDROL DOSEPAK) 4 MG Tablet Therapy Pack Use as directed, Disp-1 Each, R-0, Normal      gabapentin (NEURONTIN) 100 MG Cap Take 2 Capsules by mouth 3 times a day as needed (pain)., Disp-30 Capsule, R-0, Normal      HYDROcodone-acetaminophen (NORCO) 5-325 MG Tab per tablet Take 1 Tablet by mouth every four hours as needed (pain) for up to 3 days., Disp-10 Tablet, R-0, Normal      methocarbamol (ROBAXIN) 750 MG Tab Take 1 Tablet by mouth 4 times a day as needed (pain, spasm)., Disp-30 Tablet, R-0, Normal               FINAL DIAGNOSIS  1. Strain of lumbar region, initial encounter    2. Sciatica of left side         Electronically signed by: Yady Osorio M.D., 1/3/2025 5:53 PM

## 2025-01-04 NOTE — DISCHARGE INSTRUCTIONS
Do not drive or operate machinery when on medications.  Please follow-up with spine as an outpatient for further management.

## 2025-01-04 NOTE — ED TRIAGE NOTES
Chief Complaint   Patient presents with    Low Back Pain     PT reports she slipped on stairs and landed hard on lower back 2 weeks ago. PT reports she has been having increasing discomfort in lower back and with sciatica in left leg. PT denies loss of control of bowel or bladder.       PT ambulatory to triage for above complaint. GCS 15.     Pt is alert and oriented, speaking in full sentences, follows commands and responds appropriately to questions. NAD. Resp are even and unlabored.      Pt placed in lobby. Pt educated on triage process. Pt encouraged to alert staff for any changes.     Patient and staff wearing appropriate PPE

## 2025-06-25 ENCOUNTER — APPOINTMENT (OUTPATIENT)
Dept: RADIOLOGY | Facility: MEDICAL CENTER | Age: 55
End: 2025-06-25
Attending: STUDENT IN AN ORGANIZED HEALTH CARE EDUCATION/TRAINING PROGRAM
Payer: COMMERCIAL

## 2025-06-25 ENCOUNTER — HOSPITAL ENCOUNTER (EMERGENCY)
Facility: MEDICAL CENTER | Age: 55
End: 2025-06-25
Attending: STUDENT IN AN ORGANIZED HEALTH CARE EDUCATION/TRAINING PROGRAM
Payer: COMMERCIAL

## 2025-06-25 VITALS
DIASTOLIC BLOOD PRESSURE: 58 MMHG | BODY MASS INDEX: 31.1 KG/M2 | HEIGHT: 69 IN | WEIGHT: 210 LBS | OXYGEN SATURATION: 94 % | TEMPERATURE: 97.5 F | RESPIRATION RATE: 18 BRPM | HEART RATE: 94 BPM | SYSTOLIC BLOOD PRESSURE: 117 MMHG

## 2025-06-25 DIAGNOSIS — M79.605 LEFT LEG PAIN: Primary | ICD-10-CM

## 2025-06-25 LAB
ALBUMIN SERPL BCP-MCNC: 4.4 G/DL (ref 3.2–4.9)
ALBUMIN/GLOB SERPL: 1.3 G/DL
ALP SERPL-CCNC: 66 U/L (ref 30–99)
ALT SERPL-CCNC: 38 U/L (ref 2–50)
ANION GAP SERPL CALC-SCNC: 14 MMOL/L (ref 7–16)
AST SERPL-CCNC: 28 U/L (ref 12–45)
BASOPHILS # BLD AUTO: 0.5 % (ref 0–1.8)
BASOPHILS # BLD: 0.04 K/UL (ref 0–0.12)
BILIRUB SERPL-MCNC: 0.3 MG/DL (ref 0.1–1.5)
BUN SERPL-MCNC: 16 MG/DL (ref 8–22)
CALCIUM ALBUM COR SERPL-MCNC: 9.3 MG/DL (ref 8.5–10.5)
CALCIUM SERPL-MCNC: 9.6 MG/DL (ref 8.5–10.5)
CHLORIDE SERPL-SCNC: 102 MMOL/L (ref 96–112)
CO2 SERPL-SCNC: 23 MMOL/L (ref 20–33)
CREAT SERPL-MCNC: 0.8 MG/DL (ref 0.5–1.4)
EOSINOPHIL # BLD AUTO: 0.22 K/UL (ref 0–0.51)
EOSINOPHIL NFR BLD: 2.6 % (ref 0–6.9)
ERYTHROCYTE [DISTWIDTH] IN BLOOD BY AUTOMATED COUNT: 41.8 FL (ref 35.9–50)
GFR SERPLBLD CREATININE-BSD FMLA CKD-EPI: 87 ML/MIN/1.73 M 2
GLOBULIN SER CALC-MCNC: 3.3 G/DL (ref 1.9–3.5)
GLUCOSE SERPL-MCNC: 94 MG/DL (ref 65–99)
HCT VFR BLD AUTO: 41.5 % (ref 37–47)
HGB BLD-MCNC: 13.6 G/DL (ref 12–16)
IMM GRANULOCYTES # BLD AUTO: 0.02 K/UL (ref 0–0.11)
IMM GRANULOCYTES NFR BLD AUTO: 0.2 % (ref 0–0.9)
LYMPHOCYTES # BLD AUTO: 2.67 K/UL (ref 1–4.8)
LYMPHOCYTES NFR BLD: 31.7 % (ref 22–41)
MCH RBC QN AUTO: 28.1 PG (ref 27–33)
MCHC RBC AUTO-ENTMCNC: 32.8 G/DL (ref 32.2–35.5)
MCV RBC AUTO: 85.7 FL (ref 81.4–97.8)
MONOCYTES # BLD AUTO: 0.59 K/UL (ref 0–0.85)
MONOCYTES NFR BLD AUTO: 7 % (ref 0–13.4)
NEUTROPHILS # BLD AUTO: 4.89 K/UL (ref 1.82–7.42)
NEUTROPHILS NFR BLD: 58 % (ref 44–72)
NRBC # BLD AUTO: 0 K/UL
NRBC BLD-RTO: 0 /100 WBC (ref 0–0.2)
NT-PROBNP SERPL IA-MCNC: 40 PG/ML (ref 0–125)
PLATELET # BLD AUTO: 247 K/UL (ref 164–446)
PMV BLD AUTO: 9.2 FL (ref 9–12.9)
POTASSIUM SERPL-SCNC: 4 MMOL/L (ref 3.6–5.5)
PROT SERPL-MCNC: 7.7 G/DL (ref 6–8.2)
RBC # BLD AUTO: 4.84 M/UL (ref 4.2–5.4)
SODIUM SERPL-SCNC: 139 MMOL/L (ref 135–145)
TROPONIN T SERPL-MCNC: 9 NG/L (ref 6–19)
WBC # BLD AUTO: 8.4 K/UL (ref 4.8–10.8)

## 2025-06-25 PROCEDURE — 84484 ASSAY OF TROPONIN QUANT: CPT

## 2025-06-25 PROCEDURE — 80053 COMPREHEN METABOLIC PANEL: CPT

## 2025-06-25 PROCEDURE — 83880 ASSAY OF NATRIURETIC PEPTIDE: CPT

## 2025-06-25 PROCEDURE — 93971 EXTREMITY STUDY: CPT | Mod: LT

## 2025-06-25 PROCEDURE — 99284 EMERGENCY DEPT VISIT MOD MDM: CPT

## 2025-06-25 PROCEDURE — 85025 COMPLETE CBC W/AUTO DIFF WBC: CPT

## 2025-06-25 RX ORDER — GABAPENTIN 100 MG/1
100 CAPSULE ORAL 3 TIMES DAILY
Qty: 90 CAPSULE | Refills: 0 | Status: SHIPPED | OUTPATIENT
Start: 2025-06-25

## 2025-06-25 ASSESSMENT — FIBROSIS 4 INDEX: FIB4 SCORE: 0.82

## 2025-06-25 ASSESSMENT — PAIN DESCRIPTION - PAIN TYPE: TYPE: ACUTE PAIN

## 2025-06-26 NOTE — DISCHARGE INSTRUCTIONS
Please follow-up closely with your primary doctor for repeat evaluation.  Do not have evidence of a DVT on the ultrasound today however if you persistent pain in about a week you will need to have this repeated.

## 2025-06-26 NOTE — ED PROVIDER NOTES
ED Provider Note    CHIEF COMPLAINT  Chief Complaint   Patient presents with    Leg Pain     Pain in left lower leg. Feels like its on fire.   Pt was on a long flight June 6, pain since then.   LLE PWD.   Report some SOB intermittent over the last couple days.         EXTERNAL RECORDS REVIEWED  She was seen in 1/2025 for strain of lumbar region    HPI/ROS  LIMITATION TO HISTORY   none  OUTSIDE HISTORIAN(S):  none    Bobbymartina Lisa Singleton is a 54 y.o. female who presents with left lower extremity pain and swelling.  She says it has been ongoing for about 3 weeks now.  She says it is a burning sensation in her left calf and radiates up her leg into her thigh.  She has been using CBD oil with some relief otherwise she is doing hot bath and ice packs.  She was on a long flight in June she says she just flew from [place] but they got stuck on the plane and she was sitting for a while.  She has no history of PE or DVT.  She is not on hormones denies any trauma or injury.  No fevers.   Patient denies any chest pain.  I appreciate the triage note says some intermittent shortness of breath over the last couple days but patient denies this to me.     PAST MEDICAL HISTORY   Denies    SURGICAL HISTORY  patient denies any surgical history    FAMILY HISTORY  History reviewed. No pertinent family history.    SOCIAL HISTORY  Social History     Tobacco Use    Smoking status: Never    Smokeless tobacco: Never   Vaping Use    Vaping status: Never Used   Substance and Sexual Activity    Alcohol use: Not Currently    Drug use: Never    Sexual activity: Not on file       CURRENT MEDICATIONS  Home Medications       Reviewed by Nery Mariscal R.N. (Registered Nurse) on 06/25/25 at 2033  Med List Status: Partial     Medication Last Dose Status   diclofenac sodium (VOLTAREN) 1 % Gel  Active   doxycycline (VIBRAMYCIN) 100 MG Cap  Active   fluticasone (FLONASE) 50 MCG/ACT nasal spray  Active   gabapentin (NEURONTIN) 100 MG Cap  Active  "  methocarbamol (ROBAXIN) 750 MG Tab  Active   methylPREDNISolone (MEDROL DOSEPAK) 4 MG Tablet Therapy Pack  Active   promethazine (PHENERGAN) 25 MG Tab  Active   SUMAtriptan (IMITREX) 50 MG Tab  Active   topiramate (TOPAMAX) 25 MG Tab  Active   topiramate (TOPAMAX) 50 MG tablet  Active                    ALLERGIES  Allergies[1]    PHYSICAL EXAM  VITAL SIGNS: /58   Pulse 94   Temp 36.4 °C (97.5 °F) (Temporal)   Resp 18   Ht 1.753 m (5' 9\")   Wt 95.3 kg (210 lb)   SpO2 94%   BMI 31.01 kg/m²    Constitutional: Awake and alert. Nontoxic  HENT:  Grossly normal  Eyes: Grossly normal  Neck: Normal range of motion  Cardiovascular: Normal heart rate   Thorax & Lungs: No respiratory distress  Abdomen: Nontender  Skin:  No pathologic rash.   Extremities:   She has no appreciable left lower extremity swelling.  There is no erythema, warmth or induration.  No palpable cord.  2+ pedal pulses.  Motor and sensory exams intact distally.  Psychiatric: Affect normal      EKG/LABS  Results for orders placed or performed during the hospital encounter of 06/25/25   CBC WITH DIFFERENTIAL    Collection Time: 06/25/25 10:00 PM   Result Value Ref Range    WBC 8.4 4.8 - 10.8 K/uL    RBC 4.84 4.20 - 5.40 M/uL    Hemoglobin 13.6 12.0 - 16.0 g/dL    Hematocrit 41.5 37.0 - 47.0 %    MCV 85.7 81.4 - 97.8 fL    MCH 28.1 27.0 - 33.0 pg    MCHC 32.8 32.2 - 35.5 g/dL    RDW 41.8 35.9 - 50.0 fL    Platelet Count 247 164 - 446 K/uL    MPV 9.2 9.0 - 12.9 fL    Neutrophils-Polys 58.00 44.00 - 72.00 %    Lymphocytes 31.70 22.00 - 41.00 %    Monocytes 7.00 0.00 - 13.40 %    Eosinophils 2.60 0.00 - 6.90 %    Basophils 0.50 0.00 - 1.80 %    Immature Granulocytes 0.20 0.00 - 0.90 %    Nucleated RBC 0.00 0.00 - 0.20 /100 WBC    Neutrophils (Absolute) 4.89 1.82 - 7.42 K/uL    Lymphs (Absolute) 2.67 1.00 - 4.80 K/uL    Monos (Absolute) 0.59 0.00 - 0.85 K/uL    Eos (Absolute) 0.22 0.00 - 0.51 K/uL    Baso (Absolute) 0.04 0.00 - 0.12 K/uL    Immature " Granulocytes (abs) 0.02 0.00 - 0.11 K/uL    NRBC (Absolute) 0.00 K/uL   COMP METABOLIC PANEL    Collection Time: 06/25/25 10:00 PM   Result Value Ref Range    Sodium 139 135 - 145 mmol/L    Potassium 4.0 3.6 - 5.5 mmol/L    Chloride 102 96 - 112 mmol/L    Co2 23 20 - 33 mmol/L    Anion Gap 14.0 7.0 - 16.0    Glucose 94 65 - 99 mg/dL    Bun 16 8 - 22 mg/dL    Creatinine 0.80 0.50 - 1.40 mg/dL    Calcium 9.6 8.5 - 10.5 mg/dL    Correct Calcium 9.3 8.5 - 10.5 mg/dL    AST(SGOT) 28 12 - 45 U/L    ALT(SGPT) 38 2 - 50 U/L    Alkaline Phosphatase 66 30 - 99 U/L    Total Bilirubin 0.3 0.1 - 1.5 mg/dL    Albumin 4.4 3.2 - 4.9 g/dL    Total Protein 7.7 6.0 - 8.2 g/dL    Globulin 3.3 1.9 - 3.5 g/dL    A-G Ratio 1.3 g/dL   TROPONIN    Collection Time: 06/25/25 10:00 PM   Result Value Ref Range    Troponin T 9 6 - 19 ng/L   proBrain Natriuretic Peptide, NT    Collection Time: 06/25/25 10:00 PM   Result Value Ref Range    NT-proBNP 40 0 - 125 pg/mL   ESTIMATED GFR    Collection Time: 06/25/25 10:00 PM   Result Value Ref Range    GFR (CKD-EPI) 87 >60 mL/min/1.73 m 2         RADIOLOGY/PROCEDURES   I have independently interpreted the diagnostic imaging associated with this visit and am waiting the final reading from the radiologist.   My preliminary interpretation is as follows: No obvious DVT    Radiologist interpretation:  US-EXTREMITY VENOUS LOWER UNILAT LEFT   Final Result      No evidence of left lower extremity deep venous thrombosis.          COURSE & MEDICAL DECISION MAKING    ASSESSMENT, COURSE AND PLAN  Care Narrative: This is a 54-year-old who presents with pain in her left lower leg.  It has been ongoing for about 3 weeks.  She arrives with normal vital signs, no cardiopulmonary symptoms.  I did obtain an ultrasound which shows no evidence of DVT.  She has no exam findings to suggest cellulitis.  She has no pain out of proportion, ill appearance or rapid progression of symptoms to suggest a necrotizing skin infection.   She denies any trauma and I doubt any underlying bony injury.  Her labs are reassuring, no leukocytosis, no significant electrolyte derangements.  She has no open wounds or other risk factors of osteomyelitis.  Unclear exactly the etiology of her pain.  She does report a prior history of spinal disc herniation and it's possible she could be having pain related to radiculopathy, though presentation somewhat atypical for this. She does not have any associated back pain.   I recommended gabapentin for which she was given a prescription and close PCP follow-up and she expresses understanding.  I also advised repeat ultrasound if still having persistent calf pain in one week      DISPOSITION AND DISCUSSIONS  I have discussed management of the patient with the following physicians and ARCHIE's:  none    Discussion of management with other [facility] or appropriate source(s): None     Escalation of care considered, and ultimately not performed:none    Barriers to care at this time, including but not limited to: none    Decision tools and prescription drugs considered including, but not limited to: Pain Medications Gapapentin prescribed.    FINAL DIAGNOSIS  1. Left leg pain Acute        Electronically signed by: Meg Ortega M.D., 6/25/2025 9:26 PM           [1]   Allergies  Allergen Reactions    Amoxicillin Swelling    Cinnamon Anaphylaxis    Topiramate      Tongue swelling